# Patient Record
Sex: MALE | Race: ASIAN | NOT HISPANIC OR LATINO | Employment: FULL TIME | URBAN - METROPOLITAN AREA
[De-identification: names, ages, dates, MRNs, and addresses within clinical notes are randomized per-mention and may not be internally consistent; named-entity substitution may affect disease eponyms.]

---

## 2023-09-21 ENCOUNTER — TELEPHONE (OUTPATIENT)
Dept: FAMILY MEDICINE | Facility: CLINIC | Age: 18
End: 2023-09-21
Payer: COMMERCIAL

## 2023-09-21 NOTE — TELEPHONE ENCOUNTER
Dad called in, pt told Dad he is having panic attacks.  Pt was seen by health services on campus and they advised pt to be seen by a provider.     Pt has appt to establish care with [facility] on 9/29/23 but Dad requesting pt to be seen sooner for new anxiety concerns.      Pt not with Dad at time of call.  Pt is currently in class at the [employer].  Advised Dad to have pt call us back to be triaged when he is out of class, Dad agrees.  Dad denies pt having violent thoughts and suicidal.      Nury Camilo RN, BSN  [facility]

## 2023-09-22 ENCOUNTER — NURSE TRIAGE (OUTPATIENT)
Dept: FAMILY MEDICINE | Facility: CLINIC | Age: 18
End: 2023-09-22
Payer: COMMERCIAL

## 2023-09-22 ENCOUNTER — OFFICE VISIT (OUTPATIENT)
Dept: URGENT CARE | Facility: URGENT CARE | Age: 18
End: 2023-09-22
Payer: COMMERCIAL

## 2023-09-22 VITALS
WEIGHT: 186.8 LBS | TEMPERATURE: 98.8 F | DIASTOLIC BLOOD PRESSURE: 69 MMHG | OXYGEN SATURATION: 100 % | HEART RATE: 81 BPM | SYSTOLIC BLOOD PRESSURE: 119 MMHG

## 2023-09-22 DIAGNOSIS — F41.9 ANXIETY: Primary | ICD-10-CM

## 2023-09-22 PROCEDURE — 99204 OFFICE O/P NEW MOD 45 MIN: CPT | Performed by: PHYSICIAN ASSISTANT

## 2023-09-22 RX ORDER — ESCITALOPRAM OXALATE 10 MG/1
TABLET ORAL
Qty: 30 TABLET | Refills: 0 | Status: SHIPPED | OUTPATIENT
Start: 2023-09-22 | End: 2023-09-29

## 2023-09-22 RX ORDER — HYDROXYZINE HYDROCHLORIDE 25 MG/1
25 TABLET, FILM COATED ORAL EVERY 8 HOURS PRN
Qty: 20 TABLET | Refills: 0 | Status: SHIPPED | OUTPATIENT
Start: 2023-09-22 | End: 2024-07-29

## 2023-09-22 NOTE — TELEPHONE ENCOUNTER
Patient's father calling-no consent to communicate on file, patient gave permission to speak with father however father wanted son to speak about symptoms instead during call.     Nurse Triage SBAR    Is this a 2nd Level Triage? YES, LICENSED PRACTITIONER REVIEW IS REQUIRED    Situation: Patient reports worsening anxiety symptoms in the past few weeks.     Background: Recently started school at [employer]. Patient reports first week of school anxiety started, second week it got better and third week and on it has been bad.     Assessment: Patient states he has not gotten much sleep in the past week, reports 2-3 hours a night on 9/18-9/20 and then 6-7 hours since but waking up multiple times in the night. Rating anxiety 9-10/10 but currently at about 7-8/10. Denies suicidal thoughts. Currently at home with his mom and dad. Reports having anxiety as a kid and he was able to manage that has felt more difficult to manage this past week. Has not been able to go to work or school. Reports not eating much and unable to do regular activities. Reports doing some breathing exercises that provide some relief.     Protocol Recommended Disposition:   No disposition on file.    Recommendation: Huddled with Dr. Jensen-advised to be seen in either urgent care or ED for further evaluation.    Called patient back-advised on Dr. Jensen's advice, patient verbalized understanding and would like to be seen at [facility] today, patient's father will be taking patient. Provided address for patient, no further questions at this time.     Additional Information   Negative: SEVERE difficulty breathing (e.g., struggling for each breath, speaks in single words)   Negative: Bluish (or gray) lips or face   Negative: Difficult to awaken or acting confused (e.g., disoriented, slurred speech)   Negative: Hysterical or combative behavior   Negative: Sounds like a life-threatening emergency to the triager    Answer  "Assessment - Initial Assessment Questions  1. CONCERN: \"Did anything happen that prompted you to call today?\"       Anxiety has worsened over the past week. Has gotten 2-3 hours of sleep some days, past couple days have been 6-7 hours but waking up multiple times.   2. ANXIETY SYMPTOMS: \"Can you describe how you (your loved one; patient) have been feeling?\" (e.g., tense, restless, panicky, anxious, keyed up, overwhelmed, sense of impending doom).       All of the symptoms listed.  3. ONSET: \"How long have you been feeling this way?\" (e.g., hours, days, weeks)      Ever since moving to college, first week , 2nd week got worse, since third week it has been worsening. \"To the point where I can't do anything\"   4. SEVERITY: \"How would you rate the level of anxiety?\" (e.g., 0 - 10; or mild, moderate, severe).      Past two days 9-10 today its more of 7-8/10, feels more calm  5. FUNCTIONAL IMPAIRMENT: \"How have these feelings affected your ability to do daily activities?\" \"Have you had more difficulty than usual doing your normal daily activities?\" (e.g., getting better, same, worse; self-care, school, work, interactions)      Yes  6. HISTORY: \"Have you felt this way before?\" \"Have you ever been diagnosed with an anxiety problem in the past?\" (e.g., generalized anxiety disorder, panic attacks, PTSD). If Yes, ask: \"How was this problem treated?\" (e.g., medicines, counseling, etc.)      Not to this extenet, alwasys felt anxious somewhat. Past week I felt like I couldn't do anything  7. RISK OF HARM - SUICIDAL IDEATION: \"Do you ever have thoughts of hurting or killing yourself?\" If Yes, ask:  \"Do you have these feelings now?\" \"Do you have a plan on how you would do this?\"      no  8. TREATMENT:  \"What has been done so far to treat this anxiety?\" (e.g., medicines, relaxation strategies). \"What has helped?\"      No medications, does breathing exercises that help   9. TREATMENT - THERAPIST: \"Do you have a counselor or " "therapist? Name?\"      Started one last week at [facility]   10. POTENTIAL TRIGGERS: \"Do you drink caffeinated beverages (e.g., coffee, uzma, teas), and how much daily?\" \"Do you drink alcohol or use any drugs?\" \"Have you started any new medicines recently?\"        Caffeinated beverages: not often, no alcohol no drugs, no new medications.  11. PATIENT SUPPORT: \"Who is with you now?\" \"Who do you live with?\" \"Do you have family or friends who you can talk to?\"         Patient is back home with mom and day. Lives on campus-has 3 roommates, does have family and friends to talk to.  12. OTHER SYMPTOMS: \"Do you have any other symptoms?\" (e.g., feeling depressed, trouble concentrating, trouble sleeping, trouble breathing, palpitations or fast heartbeat, chest pain, sweating, nausea, or diarrhea)        Trouble sleeping, feeling down, trouble concentrating, trouble breathing-has to focus on it, can feel lightheaded, palpitations and fast heartbeat, sometimes has chest pain, sweating, nausea, diarrhea sometimes.   13. PREGNANCY: \"Is there any chance you are pregnant?\" \"When was your last menstrual period?\"        no    Protocols used: Anxiety and Panic Attack-A-[place]    Sally Cotto RN on 9/22/2023 at 11:27 AM    "

## 2023-09-22 NOTE — PATIENT INSTRUCTIONS
Start taking 1/2 of escitalopram daily after one week, OK to move up to a full tablet    Hydroxyzine can be used for acute anxiety, this will cause drowsiness    Use a meditation heaven  Practice deep breathing  Exercise daily    Keep your appointment for next week with your PCP

## 2023-09-22 NOTE — PROGRESS NOTES
Assessment & Plan     1. Anxiety  Patient with history of anxiety for many years going previously managed with exercise and deep breathing now worsening.  On examination, he appears well.  Vital signs are stable.  No abnormality is noted.  He does not have suicidal ideations, and reports of a good support system.  Wishes to start medication management.  We will have him start taking escitalopram at half the dose and then increase to 10 mg after 1 week.  He was given hydroxyzine to be used for acute anxiety, discussed that this will cause drowsiness.  Discussed importance of good sleep, daily exercise, meditation and deep breathing.  If having worsening symptoms, advised him to contact his therapist, who present to the emergency department.  - escitalopram (LEXAPRO) 10 MG tablet; Take 1/2 tablet for one week, then take a full tablet daily  Dispense: 30 tablet; Refill: 0  - hydrOXYzine (ATARAX) 25 MG tablet; Take 1 tablet (25 mg) by mouth every 8 hours as needed for anxiety  Dispense: 20 tablet; Refill: 0    Diagnosis and treatment plan was reviewed with patient and/or family.     Patient verbalizes understanding. All questions were addressed and answered.     Angela Hyman PA-C  [facility]    CHIEF COMPLAINT:   Chief Complaint   Patient presents with    Anxiety     Just started speaking with therapist- has been dealing with anxiety sx since childhood- PT states fatigue, nausea, heart racing, light headed- feeling like wanting to crawl out of skin, feeling as if he is not here mentally.       Trent Villalobos is a 17 year old male who presents to clinic today for evaluation of anxiety.  Noticed this started about 3 weeks ago after he started school at the [employer].  For the last week he has been significantly increased.  Endorses having nausea, palpitations, lightheadedness, fatigue and just not feeling well.  He has not been able to sleep at night, and cannot focus at  school.  He has a history of anxiety since he was a young child, initially he thought it was illness but then was shown to be anxiety.  Symptoms have come and gone over the years.  He has started talking to a counselor /therapist, who advised him to follow-up for medical management.  He denies having any suicidal or homicidal ideations.  He does not have a plan.  Has been doing deep breathing, and working out.  Endorses having a good support system.      No past medical history on file.  No past surgical history on file.  Social History     Tobacco Use    Smoking status: Not on file    Smokeless tobacco: Not on file   Substance Use Topics    Alcohol use: Not on file     Current Outpatient Medications   Medication    escitalopram (LEXAPRO) 10 MG tablet    hydrOXYzine (ATARAX) 25 MG tablet     No current facility-administered medications for this visit.     No Known Allergies    10 point ROS of systems were all negative except for pertinent positives noted in my HPI.      Exam:   /69   Pulse 81   Temp 98.8  F (37.1  C)   Wt 84.7 kg (186 lb 12.8 oz)   SpO2 100%   Constitutional: healthy, alert and no distress  Head: Normocephalic, atraumatic.  Eyes: conjunctiva clear, no drainage  ENT: TMs clear and shiny claribel, nasal mucosa pink and moist, throat without tonsillar hypertrophy or erythema. No thyroidmegaly  Neck: neck is supple, no cervical lymphadenopathy or nuchal rigidity  Cardiovascular: RRR  Respiratory: CTA bilaterally, no rhonchi or rales  Skin: no rashes  Neurologic: Speech clear, gait normal. Moves all extremities.    No results found for any visits on 09/22/23.

## 2023-09-29 ENCOUNTER — OFFICE VISIT (OUTPATIENT)
Dept: FAMILY MEDICINE | Facility: CLINIC | Age: 18
End: 2023-09-29
Payer: COMMERCIAL

## 2023-09-29 VITALS
HEIGHT: 74 IN | HEART RATE: 65 BPM | DIASTOLIC BLOOD PRESSURE: 68 MMHG | RESPIRATION RATE: 16 BRPM | WEIGHT: 186 LBS | SYSTOLIC BLOOD PRESSURE: 116 MMHG | TEMPERATURE: 98 F | OXYGEN SATURATION: 100 % | BODY MASS INDEX: 23.87 KG/M2

## 2023-09-29 DIAGNOSIS — F41.9 ANXIETY: ICD-10-CM

## 2023-09-29 DIAGNOSIS — Z00.00 PREVENTATIVE HEALTH CARE: Primary | ICD-10-CM

## 2023-09-29 PROCEDURE — 90471 IMMUNIZATION ADMIN: CPT | Performed by: FAMILY MEDICINE

## 2023-09-29 PROCEDURE — 99395 PREV VISIT EST AGE 18-39: CPT | Mod: 25 | Performed by: FAMILY MEDICINE

## 2023-09-29 PROCEDURE — 90651 9VHPV VACCINE 2/3 DOSE IM: CPT | Performed by: FAMILY MEDICINE

## 2023-09-29 PROCEDURE — 99213 OFFICE O/P EST LOW 20 MIN: CPT | Mod: 25 | Performed by: FAMILY MEDICINE

## 2023-09-29 RX ORDER — HYDROXYZINE HYDROCHLORIDE 25 MG/1
25 TABLET, FILM COATED ORAL EVERY 8 HOURS PRN
Qty: 20 TABLET | Refills: 0 | Status: CANCELLED | OUTPATIENT
Start: 2023-09-29

## 2023-09-29 RX ORDER — ESCITALOPRAM OXALATE 10 MG/1
10 TABLET ORAL DAILY
Qty: 30 TABLET | Refills: 0 | Status: SHIPPED | OUTPATIENT
Start: 2023-09-29 | End: 2023-10-30

## 2023-09-29 SDOH — HEALTH STABILITY: PHYSICAL HEALTH: ON AVERAGE, HOW MANY MINUTES DO YOU ENGAGE IN EXERCISE AT THIS LEVEL?: 90 MIN

## 2023-09-29 SDOH — HEALTH STABILITY: PHYSICAL HEALTH: ON AVERAGE, HOW MANY DAYS PER WEEK DO YOU ENGAGE IN MODERATE TO STRENUOUS EXERCISE (LIKE A BRISK WALK)?: 7 DAYS

## 2023-09-29 ASSESSMENT — PAIN SCALES - GENERAL: PAINLEVEL: NO PAIN (0)

## 2023-09-29 NOTE — PROGRESS NOTES
Preventive Care Visit  [facility]  Jonathan Jensen MD, Family Medicine  Sep 29, 2023    Assessment & Plan   17 year old, here for preventive care.    Assessment and Plan    (Z00.00) Preventative health care  (primary encounter diagnosis)  Comment:   Plan:     (F41.9) Anxiety  Comment: trial med, 1m f/unit(s)[   Plan: escitalopram (LEXAPRO) 10 MG tablet, PRIMARY         CARE FOLLOW-UP SCHEDULING, OFFICE/OUTPT         VISIT,EST,LEVL III              RTC in 1m    Jonathan Jensen MD      Growth          Immunizations   Appropriate vaccinations were ordered.    Anticipatory Guidance    Reviewed age appropriate anticipatory guidance.           Referrals/Ongoing Specialty Care  None  Verbal Dental Referral: Patient has established dental home      Started on escitalopram for anxiety about 1 w ago.  Is already finding medication helpful, but does note a bit of abd pain with this.  Sleep is als improving - had been having a lot of trouble.    Otherwise well, no acute concerns.    Subjective           9/29/2023    11:30 AM   Additional Questions   Accompanied by maggie f         9/29/2023   Social   Lives with Friends or roommates   Recent potential stressors (!) ADJUSTMENT TO CHANGE   History of trauma No   Family Hx of mental health challenges Unknown   Lack of transportation has limited access to appts/meds No    No   Do you have housing?  Yes    Yes   Are you worried about losing your housing? No    No         9/29/2023    11:33 AM   Health Risks/Safety   Do you always wear a seat belt? Yes   Helmet use? Yes            9/29/2023    11:33 AM   TB Screening: Consider immunosuppression as a risk factor for TB   Recent TB infection or positive TB test in family/close contacts No   Recent travel outside [place] (you/family/close contacts) No   Recent residence in high-risk group setting (correctional facility/health care facility/homeless shelter/refugee camp) No          9/29/2023    11:33 AM    Dyslipidemia   FH: premature cardiovascular disease No, these conditions are not present in the patient's biologic parents or grandparents   FH: hyperlipidemia No   Personal risk factors for heart disease NO diabetes, high blood pressure, obesity, smokes cigarettes, kidney problems, heart or kidney transplant, history of Kawasaki disease with an aneurysm, lupus, rheumatoid arthritis, or HIV     No results for input(s): CHOL, HDL, LDL, TRIG, CHOLHDLRATIO in the last 27512 hours.        9/29/2023    11:33 AM   Diet   What type of water? (!) BOTTLED    (!) FILTERED         9/29/2023   Diet   Do you have questions about your eating?  No   Do you have questions about your weight?  No   What do you regularly drink? Water    (!) SPORTS DRINKS    (!) COFFEE OR TEA   What type of water? (!) BOTTLED    (!) FILTERED   Do you think you eat healthy foods? Yes   At least 3 servings of food or beverages that have calcium each day? Yes   How would you describe your diet?  No restrictions   In past 12 months, concerned food might run out No    No   In past 12 months, food has run out/couldn't afford more No    No         9/29/2023   Activity   Days per week of moderate/strenuous exercise 7 days   On average, how many minutes do you engage in exercise at this level? 90 min   What do you do for exercise? Basketball and lift weights   What activities are you involved with? work         9/29/2023    11:33 AM   Media Use   Hours per day of screen time (for entertainment) 10         9/29/2023    11:33 AM   Sleep   Do you have any trouble with sleep? (!) NOT GETTING ENOUGH SLEEP (LESS THAN 8 HOURS)    (!) DAYTIME DROWSINESS OR TAKE NAPS    (!) DIFFICULTY FALLING ASLEEP    (!) DIFFICULTY STAYING ASLEEP    (!) EARLY MORNING AWAKENING         9/29/2023    11:33 AM   School   Are you in school? Yes   What school do you attend?  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         Objective     Exam  /68   Pulse 65   Temp 98  F (36.7  C) (Oral)   Resp 16   Ht 1.867 m (6' 1.5\")   Wt 84.4 kg (186 lb)   SpO2 100%   BMI 24.21 kg/m    92 %ile (Z= 1.39) based on CDC (Boys, 2-20 Years) Stature-for-age data based on Stature recorded on 9/29/2023.  85 %ile (Z= 1.03) based on CDC (Boys, 2-20 Years) weight-for-age data using vitals from 9/29/2023.  65 %ile (Z= 0.37) based on CDC (Boys, 2-20 Years) BMI-for-age based on BMI available as of 9/29/2023.  Blood pressure %florentin are not available for patients who are 18 years or older.    Vision Screen       Hearing Screen         Physical Exam  GENERAL: Active, alert, in no acute distress.  SKIN: Clear. No significant rash, abnormal pigmentation or lesions  HEAD: Normocephalic  EYES: Pupils equal, round, reactive, Extraocular muscles intact. Normal conjunctivae.  EARS: Normal canals. Tympanic membranes are normal; gray and translucent.  NOSE: Normal without discharge.  MOUTH/THROAT: Clear. No oral lesions. Teeth without obvious abnormalities.  NECK: Supple, no masses.  No thyromegaly.  LYMPH NODES: No adenopathy  LUNGS: Clear. No rales, rhonchi, wheezing or retractions  HEART: Regular rhythm. Normal S1/S2. No murmurs. Normal pulses.  ABDOMEN: Soft, non-tender, not distended, no masses or hepatosplenomegaly. Bowel sounds normal.   NEUROLOGIC: No focal findings. Cranial nerves grossly intact: DTR's normal. Normal gait, strength and tone  BACK: Spine is straight, no scoliosis.  EXTREMITIES: Full range of motion, no deformities          Jonathan Jensen MD  [facility] [place]    "

## 2023-10-30 ENCOUNTER — VIRTUAL VISIT (OUTPATIENT)
Dept: FAMILY MEDICINE | Facility: CLINIC | Age: 18
End: 2023-10-30
Payer: COMMERCIAL

## 2023-10-30 DIAGNOSIS — F41.9 ANXIETY: ICD-10-CM

## 2023-10-30 PROCEDURE — 99213 OFFICE O/P EST LOW 20 MIN: CPT | Mod: VID | Performed by: FAMILY MEDICINE

## 2023-10-30 RX ORDER — ESCITALOPRAM OXALATE 10 MG/1
10 TABLET ORAL DAILY
Qty: 90 TABLET | Refills: 1 | Status: SHIPPED | OUTPATIENT
Start: 2023-10-30 | End: 2024-04-29

## 2023-10-30 ASSESSMENT — ANXIETY QUESTIONNAIRES
GAD7 TOTAL SCORE: 1
7. FEELING AFRAID AS IF SOMETHING AWFUL MIGHT HAPPEN: NOT AT ALL
7. FEELING AFRAID AS IF SOMETHING AWFUL MIGHT HAPPEN: NOT AT ALL
3. WORRYING TOO MUCH ABOUT DIFFERENT THINGS: NOT AT ALL
8. IF YOU CHECKED OFF ANY PROBLEMS, HOW DIFFICULT HAVE THESE MADE IT FOR YOU TO DO YOUR WORK, TAKE CARE OF THINGS AT HOME, OR GET ALONG WITH OTHER PEOPLE?: NOT DIFFICULT AT ALL
IF YOU CHECKED OFF ANY PROBLEMS ON THIS QUESTIONNAIRE, HOW DIFFICULT HAVE THESE PROBLEMS MADE IT FOR YOU TO DO YOUR WORK, TAKE CARE OF THINGS AT HOME, OR GET ALONG WITH OTHER PEOPLE: NOT DIFFICULT AT ALL
GAD7 TOTAL SCORE: 1
5. BEING SO RESTLESS THAT IT IS HARD TO SIT STILL: NOT AT ALL
1. FEELING NERVOUS, ANXIOUS, OR ON EDGE: SEVERAL DAYS
6. BECOMING EASILY ANNOYED OR IRRITABLE: NOT AT ALL
2. NOT BEING ABLE TO STOP OR CONTROL WORRYING: NOT AT ALL
4. TROUBLE RELAXING: NOT AT ALL

## 2023-10-30 ASSESSMENT — ENCOUNTER SYMPTOMS
CONSTITUTIONAL NEGATIVE: 1
PALPITATIONS: 1
NERVOUS/ANXIOUS: 1
ABDOMINAL PAIN: 1
HEADACHES: 1

## 2023-10-30 NOTE — PROGRESS NOTES
Wilfredo is a 17 year old who is being evaluated via a billable video visit.      How would you like to obtain your AVS? MyChart  If the video visit is dropped, the invitation should be resent by:   Will anyone else be joining your video visit? No        Assessment and Plan    (F41.9) Anxiety  Comment: seems to be working well at this dose, altough we did discuss that there is room to adjust dose if he finds later that's needed  Plan: escitalopram (LEXAPRO) 10 MG tablet              RTC in 6m    Jonathan Jensen MD        Subjective   Wilfredo is a 17 year old, presenting for the following health issues:  Anxiety      10/30/2023     1:50 PM   Additional Questions   Roomed by Loyd avalos cma   Accompanied by self         10/30/2023     1:50 PM   Patient Reported Additional Medications   Patient reports taking the following new medications na       History of Present Illness       Mental Health Follow-up:  Patient presents to follow-up on Anxiety.    Patient's anxiety since last visit has been:  Better  The patient is not having other symptoms associated with anxiety.  Any significant life events: No  Patient is not feeling anxious or having panic attacks.  Patient has no concerns about alcohol or drug use.    He eats 0-1 servings of fruits and vegetables daily.He consumes 1 sweetened beverage(s) daily.He exercises with enough effort to increase his heart rate 60 or more minutes per day.  He exercises with enough effort to increase his heart rate 4 days per week.   He is taking medications regularly.     Finding medication is helpful, no known side effects.  Finding that over anxiety has really faded, much more able to get things done.  Sleep has also improved also, getting 8-9 hours of sleep night.  Overall is pretty satisfied with how he's feeling.      Review of Systems   Constitutional: Negative.    Cardiovascular:  Positive for palpitations.   Gastrointestinal:  Positive for abdominal pain.   Neurological:  Positive for  headaches.   Psychiatric/Behavioral:  The patient is nervous/anxious.             Objective           Vitals:  No vitals were obtained today due to virtual visit.    Physical Exam   GENERAL: Healthy, alert and no distress  EYES: Eyes grossly normal to inspection.  No discharge or erythema, or obvious scleral/conjunctival abnormalities.  RESP: No audible wheeze, cough, or visible cyanosis.  No visible retractions or increased work of breathing.    SKIN: Visible skin clear. No significant rash, abnormal pigmentation or lesions.  NEURO: Cranial nerves grossly intact.  Mentation and speech appropriate for age.  PSYCH: Mentation appears normal, affect normal/bright, judgement and insight intact, normal speech and appearance well-groomed.                Video-Visit Details    Type of service:  Video Visit     Originating Location (pt. Location): Home    Distant Location (provider location):  Off-site  Platform used for Video Visit: Seawind

## 2024-04-29 ENCOUNTER — VIRTUAL VISIT (OUTPATIENT)
Dept: FAMILY MEDICINE | Facility: CLINIC | Age: 19
End: 2024-04-29
Attending: FAMILY MEDICINE
Payer: COMMERCIAL

## 2024-04-29 DIAGNOSIS — F41.9 ANXIETY: ICD-10-CM

## 2024-04-29 PROCEDURE — 99213 OFFICE O/P EST LOW 20 MIN: CPT | Mod: 95 | Performed by: FAMILY MEDICINE

## 2024-04-29 RX ORDER — ESCITALOPRAM OXALATE 10 MG/1
10 TABLET ORAL DAILY
Qty: 90 TABLET | Refills: 1 | Status: SHIPPED | OUTPATIENT
Start: 2024-04-29

## 2024-04-29 ASSESSMENT — ANXIETY QUESTIONNAIRES
6. BECOMING EASILY ANNOYED OR IRRITABLE: NOT AT ALL
5. BEING SO RESTLESS THAT IT IS HARD TO SIT STILL: NOT AT ALL
4. TROUBLE RELAXING: NOT AT ALL
2. NOT BEING ABLE TO STOP OR CONTROL WORRYING: NOT AT ALL
GAD7 TOTAL SCORE: 0
3. WORRYING TOO MUCH ABOUT DIFFERENT THINGS: NOT AT ALL
1. FEELING NERVOUS, ANXIOUS, OR ON EDGE: NOT AT ALL
7. FEELING AFRAID AS IF SOMETHING AWFUL MIGHT HAPPEN: NOT AT ALL
IF YOU CHECKED OFF ANY PROBLEMS ON THIS QUESTIONNAIRE, HOW DIFFICULT HAVE THESE PROBLEMS MADE IT FOR YOU TO DO YOUR WORK, TAKE CARE OF THINGS AT HOME, OR GET ALONG WITH OTHER PEOPLE: NOT DIFFICULT AT ALL
GAD7 TOTAL SCORE: 0
7. FEELING AFRAID AS IF SOMETHING AWFUL MIGHT HAPPEN: NOT AT ALL
8. IF YOU CHECKED OFF ANY PROBLEMS, HOW DIFFICULT HAVE THESE MADE IT FOR YOU TO DO YOUR WORK, TAKE CARE OF THINGS AT HOME, OR GET ALONG WITH OTHER PEOPLE?: NOT DIFFICULT AT ALL

## 2024-04-29 ASSESSMENT — ENCOUNTER SYMPTOMS: NERVOUS/ANXIOUS: 1

## 2024-04-29 NOTE — PROGRESS NOTES
Wilfredo is a 18 year old who is being evaluated via a billable video visit.    How would you like to obtain your AVS? MyChart  If the video visit is dropped, the invitation should be resent by: Text to cell phone: [phone]  Will anyone else be joining your video visit? No      Assessment and Plan    (F41.9) Anxiety  Comment: working well, sleeping well, no side effects  Plan: escitalopram (LEXAPRO) 10 MG tablet              RTC in 6m CPE    Jonathan Jensen MD      Subjective   Wilfredo is a 18 year old, presenting for the following health issues:  Anxiety and Follow Up        4/29/2024     2:47 PM   Additional Questions   Roomed by Angela MARIN     Anxiety    History of Present Illness       Mental Health Follow-up:  Patient presents to follow-up on Anxiety.    Patient's anxiety since last visit has been:  Good  The patient is not having other symptoms associated with anxiety.  Any significant life events: No  Patient is not feeling anxious or having panic attacks.  Patient has no concerns about alcohol or drug use.      Feels mood is good, finds escitalopram very helpful.  Less anxiety, more able to focus and get things done.  Sleep is great - 7-8 hours nightly.  NO notable side effects.    Otherwise well with no concerns.        Objective           Vitals:  No vitals were obtained today due to virtual visit.    Physical Exam   GENERAL: alert and no distress  EYES: Eyes grossly normal to inspection.  No discharge or erythema, or obvious scleral/conjunctival abnormalities.  RESP: No audible wheeze, cough, or visible cyanosis.    SKIN: Visible skin clear. No significant rash, abnormal pigmentation or lesions.  NEURO: Cranial nerves grossly intact.  Mentation and speech appropriate for age.  PSYCH: Appropriate affect, tone, and pace of words          Video-Visit Details    Type of service:  Video Visit   Originating Location (pt. Location): Home    Distant Location (provider location):  Off-site  Platform used for Video  Visit: Sam  Signed Electronically by: Jonathan Jensen MD

## 2024-07-29 DIAGNOSIS — F41.9 ANXIETY: ICD-10-CM

## 2024-07-30 RX ORDER — HYDROXYZINE HYDROCHLORIDE 25 MG/1
25 TABLET, FILM COATED ORAL EVERY 8 HOURS PRN
Qty: 20 TABLET | Refills: 0 | Status: SHIPPED | OUTPATIENT
Start: 2024-07-30

## 2024-11-09 ENCOUNTER — HEALTH MAINTENANCE LETTER (OUTPATIENT)
Age: 19
End: 2024-11-09

## 2025-02-13 ENCOUNTER — TELEPHONE (OUTPATIENT)
Dept: FAMILY MEDICINE | Facility: CLINIC | Age: 20
End: 2025-02-13

## 2025-02-13 NOTE — CONFIDENTIAL NOTE
Patient Quality Outreach    Patient is due for the following:   Physical Preventive Adult Physical    Action(s) Taken:   Schedule a Adult Preventative    Type of outreach:    Sent [email or web address] message.    Questions for provider review:    None           Loyd Mir MA

## 2025-02-27 NOTE — CONFIDENTIAL NOTE
Patient Quality Outreach    Patient is due for the following:   Physical Preventive Adult Physical    Action(s) Taken:   Schedule a Adult Preventative    Type of outreach:    Sent letter.    Questions for provider review:    None           Loyd Mir MA

## 2025-05-29 NOTE — CONFIDENTIAL NOTE
Patient Quality Outreach    Patient is due for the following:   Physical Preventive Adult Physical    Action(s) Taken:   Schedule a Annual Wellness Visit    Type of outreach:    Sent letter.    Questions for provider review:    None         Loyd Mir MA  Chart routed to .

## 2025-07-26 ENCOUNTER — HOSPITAL ENCOUNTER (EMERGENCY)
Facility: CLINIC | Age: 20
Discharge: HOME | End: 2025-07-26
Attending: EMERGENCY MEDICINE
Payer: COMMERCIAL

## 2025-07-26 ENCOUNTER — APPOINTMENT (OUTPATIENT)
Dept: CT IMAGING | Facility: CLINIC | Age: 20
End: 2025-07-26
Attending: EMERGENCY MEDICINE
Payer: COMMERCIAL

## 2025-07-26 VITALS
OXYGEN SATURATION: 100 % | DIASTOLIC BLOOD PRESSURE: 62 MMHG | SYSTOLIC BLOOD PRESSURE: 109 MMHG | TEMPERATURE: 97.9 F | RESPIRATION RATE: 18 BRPM | HEART RATE: 96 BPM

## 2025-07-26 DIAGNOSIS — S09.90XA CLOSED HEAD INJURY, INITIAL ENCOUNTER: ICD-10-CM

## 2025-07-26 DIAGNOSIS — R41.82 ALTERED MENTAL STATUS, UNSPECIFIED ALTERED MENTAL STATUS TYPE: Primary | ICD-10-CM

## 2025-07-26 DIAGNOSIS — F10.920 ALCOHOLIC INTOXICATION WITHOUT COMPLICATION: ICD-10-CM

## 2025-07-26 PROCEDURE — 99285 EMERGENCY DEPT VISIT HI MDM: CPT | Mod: 25 | Performed by: EMERGENCY MEDICINE

## 2025-07-26 PROCEDURE — 70450 CT HEAD/BRAIN W/O DYE: CPT

## 2025-07-26 ASSESSMENT — ACTIVITIES OF DAILY LIVING (ADL)
ADLS_ACUITY_SCORE: 41

## 2025-07-26 ASSESSMENT — COLUMBIA-SUICIDE SEVERITY RATING SCALE - C-SSRS
2. HAVE YOU ACTUALLY HAD ANY THOUGHTS OF KILLING YOURSELF IN THE PAST MONTH?: NO
1. IN THE PAST MONTH, HAVE YOU WISHED YOU WERE DEAD OR WISHED YOU COULD GO TO SLEEP AND NOT WAKE UP?: NO
6. HAVE YOU EVER DONE ANYTHING, STARTED TO DO ANYTHING, OR PREPARED TO DO ANYTHING TO END YOUR LIFE?: NO

## 2025-07-26 NOTE — ED PROVIDER NOTES
Assumed care.  Pt intoxicated.  cT head neg.    Recheck at  but it was 755.  Patient awake alert and ambulatory.  He has no complaints.  He plans to go home.  He is clinically sober.  Discharged home.    MD Caryl Weathers Kristi Jo Schneider, MD  07/26/25 0750

## 2025-07-26 NOTE — ED PROVIDER NOTES
"  Emergency Department Note      History of Present Illness     Chief Complaint   Alcohol Intoxication and Fall      HPI   Wilfredo Young is a 19 year old male here for evaluation of alcohol intoxication and fall. EMS reports that the patient was found unresponsive 2 blocks away from his house. He tripped over a trash can and hit his head after he drank. He had a blood alcohol level of 0.229. He denies nausea.    Independent Historian   EMS as detailed above.    Past Medical History     Medical History and Problem List   Anxiety  GERD    Medications   Lexapro    Physical Exam     Patient Vitals for the past 24 hrs:   BP Temp Temp src Pulse SpO2   07/26/25 0615 -- 97.9  F (36.6  C) Temporal -- --   07/26/25 0311 -- -- -- -- 98 %   07/26/25 0257 135/86 -- -- 103 100 %     Physical Exam  General: Patient is alert, awake and interactive when I enter the room  Head: The scalp, face, and head appear normal. Atraumatic.   Eyes: The pupils are equal, round, and reactive to light. Conjunctivae and sclerae are normal  ENT: No hemotympanum or signs basilar skull fracture. The oropharynx is normal without erythema. No tenderness to palpation of the face, nose or jaw.   Neck: Normal range of motion. No cervical midline tenderness.   CV: Regular rate. S1/S2. No murmurs.   Resp: Lungs are clear without wheezes or rales. No distress. No crepitance.   GI: Abdomen is soft, no rigidity, guarding, or rebound. No contusion. No distension. No tenderness to palpation in any quadrant.     MS: Normal tone. Joints grossly normal without effusions. No asymmetric leg swelling, calf or thigh tenderness. Normal motor assessment of all extremities. PROM performed of all major joints without pain . No C/T/L tenderness in midline  Skin: No rash or lesions noted. Normal capillary refill noted  Neuro:  GCS 15.  CN\"s II-XII intact. Speech is normal and fluent. Face is symmetric. Strength is normal and symmetric.   Psych: Normal affect.  Appropriate " interactions.     Diagnostics     Lab Results   Labs Ordered and Resulted from Time of ED Arrival to Time of ED Departure - No data to display    Imaging   CT Head w/o Contrast   Final Result   IMPRESSION:   1.  No CT finding of a mass, hemorrhage or focal area suggestive of acute infarct.             ED Course      ED Course   ED Course as of 07/26/25 0627   Sat Jul 26, 2025   0258 I obtained history and examined the patient as noted above.          Medical Decision Making / Diagnosis     RANDALL Young is a 19 year old male who presents for evaluation of altered mental status and closed head injury.  He is intoxicated here in ED and this is the most likely etiology for their AMS.  Nonetheless a broad differential diagnosis was considered for the altered state including drug ingestion, infection, intracerebral issues, metabolic derangements, psychiatric decompensation. Ct head negative. Remainder of physical exam was negative. Supportive outpatient management is indicated as I do not believe there is a coingestion nor do I anticipate deterioration from their current level of mentation. Plan for observation in the ED and likely discharge home.     Disposition   Pending     Diagnosis     ICD-10-CM    1. Altered mental status, unspecified altered mental status type  R41.82       2. Closed head injury, initial encounter  S09.90XA       3. Alcoholic intoxication without complication  F10.920            Scribe Disclosure:  I, Loida Alberts, am serving as a scribe at 2:58 AM on 7/26/2025 to document services personally performed by Spencer Wheeler MD based on my observations and the provider's statements to me.        Spencer Wheeler MD  07/26/25 0643

## 2025-07-26 NOTE — ED NOTES
Pt tolerated PO and walked to the bathroom and back to room independently with steady gait, pt A/Ox4 and VSS.

## 2025-07-26 NOTE — ED NOTES
Bed: ED09  Expected date: 7/26/25  Expected time: 2:50 AM  Means of arrival: Ambulance  Comments:  [facility]

## 2025-07-26 NOTE — ED TRIAGE NOTES
Pt [facility] via EMS. [facility] found unresponsive laying on his back after falling over a trash can. .229 [address]. He reportedly hit his head and lost consciousness so was sent here for evaluation. VSS, calm and cooperative     Triage Assessment (Adult)       Row Name 07/26/25 0257          Triage Assessment    Airway WDL WDL        Respiratory WDL    Respiratory WDL WDL        Skin Circulation/Temperature WDL    Skin Circulation/Temperature WDL WDL        Cardiac WDL    Cardiac WDL WDL        Peripheral/Neurovascular WDL    Peripheral Neurovascular WDL WDL        Cognitive/Neuro/Behavioral WDL    Cognitive/Neuro/Behavioral WDL X;speech        Pupils (CN II)    Pupil PERRLA yes     Pupil Size Left 3 mm     Pupil Size Right 3 mm        Thea Coma Scale    Best Eye Response 4-->(E4) spontaneous     Best Motor Response 6-->(M6) obeys commands     Best Verbal Response 5-->(V5) oriented     Thea Coma Scale Score 15

## 2025-07-26 NOTE — DISCHARGE INSTRUCTIONS
